# Patient Record
(demographics unavailable — no encounter records)

---

## 2025-04-22 NOTE — PHYSICAL EXAM
[Normal Rate and Rhythm] : normal rate and rhythm [2+] : left 2+ [Ankle Swelling (On Exam)] : present [Ankle Swelling Bilaterally] : bilaterally  [Ankle Swelling On The Right] : mild [Alert] : alert [Anxious] : anxious [Varicose Veins Of Lower Extremities] : not present [] : not present [Skin Ulcer] : no ulcer [de-identified] : No acute distress noted [de-identified] : No palpable cords.  No calf tenderness. [de-identified] : Diffuse blistering bilateral anterior lower leg

## 2025-04-22 NOTE — ASSESSMENT
[Arterial/Venous Disease] : arterial/venous disease [FreeTextEntry1] : 58-year-old female with bilateral lower extremity edema.  Pedal pulses are intact bilaterally.  Based on clinical examination, there is no evidence of significant arterial insufficiency at this time.  Suspect venous insufficiency.  Recommend compression stockings, leg elevation, and weight loss.  Patient to return to office for venous duplex of bilateral lower extremities.

## 2025-04-22 NOTE — HISTORY OF PRESENT ILLNESS
[FreeTextEntry1] : Patient is a 58-year-old female with history significant for Sjogren's disease and morbid obesity who presents to the office today for evaluation of of bilateral lower extremity edema.  Patient reports worsening edema over the past few months.  Patient was seen at several vein clinics and and is now looking for a second opinion.  No chills.  Denies chest pain or shortness of breath.  Denies rest pain or claudication symptoms.  Denies tissue loss or bleeding varicosities.  No history of cardiac disease.  No history of deep vein thrombosis or pulmonary embolism.  No history of smoking.